# Patient Record
Sex: FEMALE | Race: WHITE | NOT HISPANIC OR LATINO | Employment: FULL TIME | URBAN - METROPOLITAN AREA
[De-identification: names, ages, dates, MRNs, and addresses within clinical notes are randomized per-mention and may not be internally consistent; named-entity substitution may affect disease eponyms.]

---

## 2022-06-26 ENCOUNTER — HOSPITAL ENCOUNTER (EMERGENCY)
Facility: HOSPITAL | Age: 52
Discharge: HOME | End: 2022-06-26
Attending: EMERGENCY MEDICINE | Admitting: EMERGENCY MEDICINE
Payer: COMMERCIAL

## 2022-06-26 VITALS
TEMPERATURE: 97.8 F | DIASTOLIC BLOOD PRESSURE: 74 MMHG | SYSTOLIC BLOOD PRESSURE: 149 MMHG | OXYGEN SATURATION: 97 % | HEART RATE: 99 BPM | WEIGHT: 293 LBS

## 2022-06-26 DIAGNOSIS — M79.605 LEG PAIN, BILATERAL: ICD-10-CM

## 2022-06-26 DIAGNOSIS — M79.604 LEG PAIN, BILATERAL: ICD-10-CM

## 2022-06-26 DIAGNOSIS — R51.9 ACUTE NONINTRACTABLE HEADACHE, UNSPECIFIED HEADACHE TYPE: ICD-10-CM

## 2022-06-26 PROCEDURE — 99284 EMERGENCY DEPT VISIT MOD MDM: CPT | Mod: 25

## 2022-06-26 PROCEDURE — 250N000011 HC RX IP 250 OP 636: Performed by: EMERGENCY MEDICINE

## 2022-06-26 PROCEDURE — 250N000013 HC RX MED GY IP 250 OP 250 PS 637: Performed by: EMERGENCY MEDICINE

## 2022-06-26 PROCEDURE — 96372 THER/PROPH/DIAG INJ SC/IM: CPT | Performed by: EMERGENCY MEDICINE

## 2022-06-26 RX ORDER — CYCLOBENZAPRINE HCL 10 MG
10 TABLET ORAL 3 TIMES DAILY PRN
Qty: 20 TABLET | Refills: 0 | Status: SHIPPED | OUTPATIENT
Start: 2022-06-26 | End: 2022-07-03

## 2022-06-26 RX ORDER — ACETAMINOPHEN 325 MG/1
975 TABLET ORAL ONCE
Status: COMPLETED | OUTPATIENT
Start: 2022-06-26 | End: 2022-06-26

## 2022-06-26 RX ORDER — LORAZEPAM 0.5 MG/1
1 TABLET ORAL ONCE
Status: COMPLETED | OUTPATIENT
Start: 2022-06-26 | End: 2022-06-26

## 2022-06-26 RX ORDER — GABAPENTIN 100 MG/1
100 CAPSULE ORAL 3 TIMES DAILY
Qty: 20 CAPSULE | Refills: 0 | Status: SHIPPED | OUTPATIENT
Start: 2022-06-26

## 2022-06-26 RX ORDER — KETOROLAC TROMETHAMINE 30 MG/ML
30 INJECTION, SOLUTION INTRAMUSCULAR; INTRAVENOUS ONCE
Status: COMPLETED | OUTPATIENT
Start: 2022-06-26 | End: 2022-06-26

## 2022-06-26 RX ADMIN — ACETAMINOPHEN 975 MG: 325 TABLET, FILM COATED ORAL at 21:26

## 2022-06-26 RX ADMIN — KETOROLAC TROMETHAMINE 30 MG: 30 INJECTION, SOLUTION INTRAMUSCULAR at 21:26

## 2022-06-26 RX ADMIN — LORAZEPAM 1 MG: 0.5 TABLET ORAL at 21:29

## 2022-06-27 NOTE — ED TRIAGE NOTES
Woke up today with migraine,  And developed pain down both legs. Had tylenol and ibuprofen. Pt pacing in triage , unable to sit down.     Triage Assessment     Row Name 06/26/22 2035       Triage Assessment (Adult)    Airway WDL WDL

## 2022-06-27 NOTE — DISCHARGE INSTRUCTIONS
You were seen tonight in the emergency department at [facility] for burning pain in the legs and secondary concerns about a headache.  You were treated with a few different medicines here with good effect.  We are hopeful that these medicines will help you with your pain tonight.  We would recommend continuing Tylenol up to 1000 mg every 6 hours and ibuprofen up to 800 mg every 6 hours for initial pain management.  You can use Flexeril 10 mg 3 times a day to help with muscle spasm and pain and try gabapentin 100 mg up to 3 times a day to help with burning nerve pain.  If you are using Flexeril or gabapentin for pain we would recommend against driving while you are using these medicines for pain.

## 2022-06-27 NOTE — ED PROVIDER NOTES
EMERGENCY DEPARTMENT ENCOUNTER      NAME: Lis Field  AGE: 52 year old female  YOB: 1970  MRN: [id number]  EVALUATION DATE & TIME: 2022  9:00 PM    PCP: No primary care provider on file.    ED PROVIDER: Duke Rogers M.D.      Chief Complaint   Patient presents with     Headache     Leg Pain       FINAL IMPRESSION:  1. Leg pain, bilateral    2. Acute nonintractable headache, unspecified headache type        ED COURSE & MEDICAL DECISION MAKIN year old female presents to the Emergency Department for evaluation of bilateral burning leg pain, restlessness, and headache.  She reports symptoms started this morning.  She is staying here with a friend for the last couple of days visiting from [place].  She has had leg pain all day today, she describes this as burning down bilateral thighs.  She has normal sensation and strength throughout her bilateral lower extremities.  Her pain does not seem to clearly radiate from her back although she says she has had sciatica in the past.  The headache she is experiencing seems to be more of a secondary concern, was gradual onset with no red flag symptoms and normal neurological exam at this point.  She was treated with oral Ativan, Tylenol, and intramuscular Toradol and is resting comfortably on recheck.  I do not think there is indication for further lab or imaging work-up at this point.  She had good effect from medications here and I recommended Tylenol and ibuprofen for home with addition of Flexeril or gabapentin if she finds these helpful when she is not driving.  She is going to follow-up with her primary doctor upon return to [place].  Patient was discharged in good condition.    At the conclusion of the encounter I discussed the results of all of the tests and the disposition. The questions were answered. The patient or family acknowledged understanding and was agreeable with the care plan.       MEDICATIONS GIVEN IN THE  EMERGENCY:  Medications   LORazepam (ATIVAN) tablet 1 mg (1 mg Oral Given 6/26/22 2129)   acetaminophen (TYLENOL) tablet 975 mg (975 mg Oral Given 6/26/22 2126)   ketorolac (TORADOL) injection 30 mg (30 mg Intramuscular Given 6/26/22 2126)       NEW PRESCRIPTIONS STARTED AT TODAY'S ER VISIT  New Prescriptions    CYCLOBENZAPRINE (FLEXERIL) 10 MG TABLET    Take 1 tablet (10 mg) by mouth 3 times daily as needed for muscle spasms    GABAPENTIN (NEURONTIN) 100 MG CAPSULE    Take 1 capsule (100 mg) by mouth 3 times daily          =================================================================    HPI    Patient information was obtained from: patient    Use of : N/A         Lis Field is a 52 year old female with no pertinent medical history on file who presents to this ED via walk-in for evaluation of leg pain and headache.  Patient is staying with a friend, visiting from [place] for the last 3 days.  She has been staying on air mattress at home.  She says she woke up with significant pain in her bilateral thighs, she describes this as burning, shooting down mostly the lateral aspect of both lower extremities down to the ankle.  This does not really get into her back.  She says she has had sciatica in the past, this feels somewhat different.  It is better when she is up moving around, worse with laying back or sitting down.  She has taken some Tylenol and ibuprofen early in the day today.  She denies any fevers.  She also reports a headache, she describes as a migraine that started this morning as well.  Was gradual in onset shortly after she woke up, submaximal in severity.  Some photophobia.  Feels that her headache is currently improving, she has had this in the past several times in her life and she is more concerned about the leg pain at this point.  She denies any other numbness or weakness.  No falls or trauma or injury.  No abdominal pain.      REVIEW OF SYSTEMS   All systems reviewed and negative  except as noted in HPI.    PAST MEDICAL HISTORY:  History reviewed. No pertinent past medical history.    PAST SURGICAL HISTORY:  History reviewed. No pertinent surgical history.        CURRENT MEDICATIONS:    No current facility-administered medications for this encounter.     Current Outpatient Medications   Medication     cyclobenzaprine (FLEXERIL) 10 MG tablet     gabapentin (NEURONTIN) 100 MG capsule         ALLERGIES:  Allergies   Allergen Reactions     Penicillins Hives       FAMILY HISTORY:  No family history on file.    SOCIAL HISTORY:        VITALS:  BP (!) 141/80   Pulse 95   Temp 97.8  F (36.6  C) (Temporal)   Wt 136.1 kg (300 lb)     PHYSICAL EXAM    Constitutional: Well developed, Well nourished, NAD.  HENT: Normocephalic, Atraumatic. Neck Supple.  Eyes: EOMI, Conjunctiva normal.  Respiratory: Breathing comfortably on room air. Speaks full sentences easily. Lungs clear to ascultation.  Cardiovascular: Normal heart rate, Regular rhythm. No peripheral edema.  Abdomen: Soft, nontender  Musculoskeletal: Good range of motion in all major joints. No major deformities noted.  Integument: Warm, Dry.  Neurologic: Fully awake, alert, oriented.  Face is symmetric.  Speech is normal.  Cranial nerves II through XII intact.  Strength is 5 out of 5 throughout bilateral upper and lower extremities.  Sensation to light touch intact x4.  Finger-nose-finger testing is normal.  Patient is ambulatory.  Psychiatric: Cooperative. Affect appropriate.       Duke Rogers M.D.  Emergency Medicine  [facility] EMERGENCY DEPARTMENT  [address] [place] [id number]  [phone]  Dept: [phone]       Duke Rogers MD  06/26/22 1277